# Patient Record
Sex: MALE | Race: WHITE
[De-identification: names, ages, dates, MRNs, and addresses within clinical notes are randomized per-mention and may not be internally consistent; named-entity substitution may affect disease eponyms.]

---

## 2018-08-26 ENCOUNTER — HOSPITAL ENCOUNTER (EMERGENCY)
Dept: HOSPITAL 61 - ER | Age: 58
Discharge: HOME | End: 2018-08-26
Payer: COMMERCIAL

## 2018-08-26 VITALS — BODY MASS INDEX: 35.7 KG/M2 | HEIGHT: 69 IN | WEIGHT: 241 LBS

## 2018-08-26 VITALS
DIASTOLIC BLOOD PRESSURE: 87 MMHG | SYSTOLIC BLOOD PRESSURE: 181 MMHG | SYSTOLIC BLOOD PRESSURE: 181 MMHG | DIASTOLIC BLOOD PRESSURE: 87 MMHG

## 2018-08-26 DIAGNOSIS — S89.92XA: Primary | ICD-10-CM

## 2018-08-26 DIAGNOSIS — E11.9: ICD-10-CM
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
## 2018-08-26 DIAGNOSIS — I10: ICD-10-CM

## 2018-08-26 PROCEDURE — 73562 X-RAY EXAM OF KNEE 3: CPT

## 2018-08-26 PROCEDURE — 99284 EMERGENCY DEPT VISIT MOD MDM: CPT

## 2018-08-26 NOTE — PHYS DOC
Past Medical History


Past Medical History:  Arthritis, Diabetes-Type II, Hypertension


Past Surgical History:  Other


Additional Past Surgical Histo:  MULTIPLE LUMBAR SURGERIES AT [facility]


Alcohol Use:  None


Drug Use:  None





Adult General


Chief Complaint


Chief Complaint:  KNEE INJURY





HPI


HPI





Patient is a 58  year old male who is presenting to the emergency room with 

knee pain for 2 weeks she has orthopnea of tomorrow but wanted to be checked 

out to make sure that it was not causing any more damage he twisted his knee 

about 2 weeks ago when he was running quickly across the street. he also was 

wonering if he could get an mri





Allergies


Allergies





Allergies








Coded Allergies Type Severity Reaction Last Updated Verified


 


  No Known Drug Allergies    6/16/14 No











Physical Exam


Physical Exam





Constitutional: Well developed, well nourished, no acute distress, non-toxic 

appearance. []


HENT: Normocephalic, atraumatic, bilateral external ears normal, oropharynx 

moist, no oral exudates, nose normal. []


Eyes: PERRLA, EOMI, conjunctiva normal, no discharge. [] 


Neck: Normal range of motion, no tenderness, supple, no stridor. [] 


normal resp effort no increased work of breathing.





Extremities: mild ttp noted left ant and posterior knee no erythema or 

inudration pedal pulses intact.


Neurologic: Alert and oriented X 3, normal motor function





Current Patient Data


Vital Signs





 Vital Signs








  Date Time  Temp Pulse Resp B/P (MAP) Pulse Ox O2 Delivery O2 Flow Rate FiO2


 


8/26/18 12:20 98.0 78 18 181/87 (118) 98 Room Air  





 98.0       











EKG


EKG


[]





Radiology/Procedures


Radiology/Procedures


[]


Impressions:


IMPRESSION:  


1. No acute fracture or malalignment.


2. Unchanged mild tricompartmental arthrosis. Chondrocalcinosis which can 


be seen with CPPD.


 


Electronically signed by: Sergey Guaman MD (8/26/2018 12:57 PM) 


[facility]














DICTATED and SIGNED BY:     SERGEY GUAMAN MD


DATE:     08/26/18 7027








Course & Med Decision Making


Course & Med Decision Making


Pertinent Labs and Imaging studies reviewed. (See chart for details)





58-year-old male with complaint of knee pain is been there for 2 weeks since he 

twisted he is worried about internal derangement he is also follow-up tomorrow 

I encouraged him to continue with this appointment no other acute pathology 

identified advised blood pressure follow-up in 1 month





Dragon Disclaimer


Dragon Disclaimer


This electronic medical record was generated, in whole or in part, using a 

voice recognition dictation system.





Departure


Departure


Impression:  


 Primary Impression:  


 Knee injury


Disposition:  01 HOME, SELF-CARE


Condition:  STABLE


Patient Instructions:  Knee Sprain, Easy-to-Read





Additional Instructions:  


KEEP YOUR BRACE ON, SEE DR MCNALLY AS SCHEDULED


GET YOUR BLOOD PRESSURE CHECKED IN ONE MONTH











TEODORO OROZCO MD Aug 26, 2018 14:09

## 2018-08-26 NOTE — RAD
KNEE LEFT 3V (AP, oblique, lateral)

 

INDICATION:  TWISTED KNEE A COUPLE WEEKS AGO

 

COMPARISON:  Knee radiographs dated 10/24/2016.

 

FINDINGS:  

 

No acute fracture or malalignment. Unchanged mild tricompartmental 

arthrosis. Chondrocalcinosis which can be seen with CPPD. Patellar 

enthesophyte. No suprapatellar joint effusion. Bony mineralization is 

normal for the patient's age. Vascular calcifications. No radiopaque 

foreign body.

 

IMPRESSION:  

1. No acute fracture or malalignment.

2. Unchanged mild tricompartmental arthrosis. Chondrocalcinosis which can 

be seen with CPPD.

 

Electronically signed by: Sergey Faria MD (8/26/2018 12:57 PM) 

[facility]

## 2018-08-31 ENCOUNTER — HOSPITAL ENCOUNTER (OUTPATIENT)
Dept: HOSPITAL 61 - KCIC MRI | Age: 58
Discharge: HOME | End: 2018-08-31
Attending: ORTHOPAEDIC SURGERY
Payer: COMMERCIAL

## 2018-08-31 DIAGNOSIS — E11.9: ICD-10-CM
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
## 2018-08-31 DIAGNOSIS — I10: ICD-10-CM
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
## 2018-08-31 DIAGNOSIS — S83.242A: Primary | ICD-10-CM

## 2018-08-31 DIAGNOSIS — E03.9: ICD-10-CM

## 2018-08-31 DIAGNOSIS — M71.22: ICD-10-CM
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
## 2018-08-31 PROCEDURE — 73721 MRI JNT OF LWR EXTRE W/O DYE: CPT

## 2018-08-31 NOTE — [facility]
LOWER EXT JOINT WO LT dated 8/31/2018 11:45 AM

 

Indication: Left knee pain . Injury 3 weeks ago.

 

Comparison: Plain films dated 8/27/2018.

 

Technique:

Routine multiplanar multisequence imaging performed.  . 

 

 

Findings:  

Mild tricompartmental hypertrophic change with small marginal osteophytes.

Thinning and surface irregularity of the articular cartilage throughout. 

Full-thickness cartilage loss over the weightbearing surfaces medial 

femoral condyle medial tibial plateau.

 

Small joint effusion. Large popliteal cyst measures up to 8 cm 

craniocaudal dimension. There is globular increased signal deep to the PCL

with heterogeneous signal along the posterior medial joint space.

 

Anterior cruciate and posterior cruciate ligaments are intact. Increased 

signal within the substance of the PCL with globular signal abnormality 

deep to the PCL. Medial and lateral collateral complexes are intact. Mild 

increased signal along the superficial and deep margins of the MCL complex

proximally. Iliotibial band, popliteus tendon and pes anserine complex 

within normal limits.

 

Quadriceps and patellar tendon are intact. No abnormality of the medial or

lateral retinaculum. Mild patchy superficial infrapatellar edema, 

nonspecific.

 

There is some linear signal at the medial meniscal body that does not 

definitely reach an articular surface. There is focal blunting of the free

edge of the medial meniscal root. There is also some irregular signal at 

the anterior horn of medial meniscus near the meniscal periphery with a 

globular focus of hypointense signal along the peripheral edge that 

measures up to 10 mm in size. This correlates with an area of vague 

calcification on the recent plain films. Similar findings are also noted 

at the anterior horn the lateral meniscus. Lateral meniscus is otherwise 

normal in morphology and signal.

 

 

Impression:

 

1. There are globular areas of signal abnormality within the joint space, 

along the undersurface of the PCL and along the margins of the anterior 

horn medial and lateral meniscus and posterior medial joint space, of 

uncertain etiology. Could represent nodular synovitis or pigmented nodular

synovitis or other chronic inflammatory synovial process.

2. Small joint effusion and large popliteal cyst.

3. Radial tear at the medial meniscal root, likely degenerative. There may

also be peripheral tears or degeneration of the anterior horn of medial 

and lateral menisci.

4. Increased signal of the PCL, intrasubstance degeneration versus 

partial-thickness tearing

5. Mild increased signal along the superficial and deep margins of the 

medial collateral ligament complex, likely reactive edema. Low-grade 

strain cannot be excluded.

 

Electronically signed by: Robert Bobby MD (8/31/2018 12:40 PM) 

[facility]

## 2018-09-21 VITALS — SYSTOLIC BLOOD PRESSURE: 162 MMHG | DIASTOLIC BLOOD PRESSURE: 79 MMHG

## 2019-08-08 ENCOUNTER — HOSPITAL ENCOUNTER (OUTPATIENT)
Dept: HOSPITAL 61 - KCIC DEXA | Age: 59
Discharge: HOME | End: 2019-08-08
Attending: ORTHOPAEDIC SURGERY
Payer: COMMERCIAL

## 2019-08-08 DIAGNOSIS — M71.21: ICD-10-CM

## 2019-08-08 DIAGNOSIS — M25.461: ICD-10-CM
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
## 2019-08-08 DIAGNOSIS — M94.261: ICD-10-CM

## 2019-08-08 DIAGNOSIS — E11.9: ICD-10-CM
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
## 2019-08-08 DIAGNOSIS — S83.281A: ICD-10-CM

## 2019-08-08 DIAGNOSIS — Z13.820: Primary | ICD-10-CM

## 2019-08-08 DIAGNOSIS — E07.9: ICD-10-CM
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
## 2019-08-08 PROCEDURE — 77080 DXA BONE DENSITY AXIAL: CPT

## 2019-08-08 PROCEDURE — 73721 MRI JNT OF LWR EXTRE W/O DYE: CPT

## 2019-08-08 NOTE — [facility]
EXAM: Dual energy x-ray absorptiometry (DEXA).

 

HISTORY: 59-year-old male with foot fractures and thyroid disease. 

Osteoporosis screening.

 

COMPARISON: None.

 

TECHNIQUE: Dual energy x-ray absorptiometry of the lumbar spine and left 

hip was performed.  Calculation of bone mineral density based on standard 

deviations above or below the expected young adult normal value (T-score) 

was completed. 

 

FINDINGS: The average bone mineral density in the 1st through 4th lumbar 

vertebrae is 1.591 g/cmxcm, corresponding with a T-score of 4.5.

 

The average total bone mineral density in the left hip is 1.318 g/cmxcm, 

corresponding with a T-score of 1.9.

 

IMPRESSION:   

 

Normal bone mineral density.

 

Note: Definitions established by the World Health Organization:

 

1. Normal: T-score is -1.0 or above.

2. Osteopenia: T-score is between -1.0 and -2.5 .

3. Osteoporosis: T-score is -2.5 or below. 

 

Electronically signed by: Nida Camilo MD (8/8/2019 4:42 PM) [place] [id number]

## 2019-08-16 ENCOUNTER — HOSPITAL ENCOUNTER (OUTPATIENT)
Dept: HOSPITAL 61 - SURG | Age: 59
End: 2019-08-16
Attending: ORTHOPAEDIC SURGERY
Payer: COMMERCIAL

## 2019-08-16 VITALS — WEIGHT: 250 LBS | BODY MASS INDEX: 35 KG/M2 | HEIGHT: 71 IN

## 2019-08-16 VITALS
DIASTOLIC BLOOD PRESSURE: 62 MMHG | SYSTOLIC BLOOD PRESSURE: 145 MMHG | DIASTOLIC BLOOD PRESSURE: 62 MMHG | SYSTOLIC BLOOD PRESSURE: 145 MMHG
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
## 2019-08-16 DIAGNOSIS — I10: ICD-10-CM

## 2019-08-16 DIAGNOSIS — M94.261: ICD-10-CM

## 2019-08-16 DIAGNOSIS — E11.9: ICD-10-CM
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
## 2019-08-16 DIAGNOSIS — S83.271A: ICD-10-CM

## 2019-08-16 DIAGNOSIS — S83.241A: Primary | ICD-10-CM
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
## 2019-08-16 DIAGNOSIS — Z79.84: ICD-10-CM

## 2019-08-16 PROCEDURE — C1782 MORCELLATOR: HCPCS

## 2019-08-16 PROCEDURE — 82962 GLUCOSE BLOOD TEST: CPT

## 2019-08-16 PROCEDURE — 29880 ARTHRS KNE SRG MNISECTMY M&L: CPT

## 2019-08-16 PROCEDURE — A7015 AEROSOL MASK USED W NEBULIZE: HCPCS

## 2019-08-16 RX ADMIN — FENTANYL CITRATE PRN MCG: 50 INJECTION INTRAMUSCULAR; INTRAVENOUS at 12:44

## 2019-08-16 RX ADMIN — INSULIN LISPRO PRN UNIT: 100 INJECTION, SOLUTION INTRAVENOUS; SUBCUTANEOUS at 12:52

## 2019-08-16 RX ADMIN — INSULIN LISPRO PRN UNIT: 100 INJECTION, SOLUTION INTRAVENOUS; SUBCUTANEOUS at 08:50

## 2019-08-16 RX ADMIN — FENTANYL CITRATE PRN MCG: 50 INJECTION INTRAMUSCULAR; INTRAVENOUS at 12:58

## 2019-08-16 NOTE — DISCH
DISCHARGE INSTRUCTIONS


Condition on Discharge


Condition on Discharge:  Stable





Activity After Discharge


Activity Instructions for Disc:  Other, see below (slow advance activities as 

tolerated)


Weight Bearing Status after Di:  As tolerated





Diet after Discharge


Diet after Discharge:  Regular





Wound Incision Care


Wound/Incision Care:  Ice to area for comfort, Change dressing (remove dressing 

in 2 days may then shower no soaking until sutures removed)





Contacting the  after DC


Call your doctor for:  Concerns you may have





Follow-Up


Follow up with:  Dr. Renner 10 days











DONITA RENNER MD           Aug 16, 2019 09:35

## 2019-08-16 NOTE — PDOC4
Operative Note


Operative Note


Date of surgery: 8/16/2019





Preoperative diagnosis: Right knee meniscal tear





Postoperative diagnosis: Same with medial and lateral meniscal tears and removal

greater than 2 cm loose body intercondylar notch area through an enlarged portal

and debridement of extensive apparent calcium pyrophosphate disease





Operative procedure: Right knee arthroscopy partial medial and lateral 

meniscectomies extensive debridement and removal 2 centimeter loose body





Surgeon: Jud





Anesthesia: Gen.





Estimated blood loss: 5 mL





Complications: None





Operative indications: Please see my preoperative clinic note for detailed 

operative indications and note that he was having pain and swelling particularly

with twisting pivoting activities sharp in nature and while he had decrease in 

activities due to a foot surgery with some resolution of his symptoms and he 

twisted his knee noted increased symptoms intermittently but consistently. MRI 

appeared consistent with clinical the description of the medial meniscus tear I 

had gone over with him risks benefits postoperative course of arthroscopic treat

ment nonoperative treatment alternatives the fact that I could not undo 

degenerative changes in the knee and he may have continued pain nerve or blood 

vessel damage infection medical or other anesthetic competitions among others 

all his questions were answered he agrees to proceed with surgical evaluation 

and treatment.





Operative text: Patient was identified procedure verified patient placed in the 

supine position on the operating table. After adequate amounts of general 

anesthesia were administered the right lower extremity was prepped and draped in

standard sterile fashion with a thigh tourniquet. After timeout was performed 

patient procedure identified and verified the right lower extremity was 

exsanguinated by Esmarch bandage tourniquet inflated to 350 mm mercury a lateral

portal was established a medial portal established using spinal needle 

localization and the knee joint was systematically examined. He was noted to 

have extensive grade 4 chondromalacia over the patellofemoral joint with overall

good tracking and extensive deposition consistent with CPPD throughout the knee 

joint and synovium. Although there were no large loose bodies in the gutters or 

suprapatellar pouch debridement of CPPD in the synovium was carried out with the

arthroscopic shaver he was noted to have a posterior horn medial meniscus tear 

that was displaceable and was trimmed back to stable tissue using arthroscopic 

punch and shaver. He had a large loose body anterior to the ACL in the 

intercondylar notch area that appeared to be somewhat calcified and perhaps 

related to the CPPD as well as it did impinge in full knee extension. This was 

removed through an enlarged medial portal and further debridement of the 

involved tissues with deposition was carried out with the arthroscopic shaver 

ACL was probed and found to be intact as was the PCL he had extensive 

involvement of complex tearing of the lateral meniscus which was debrided back 

to stable tissue using arthroscopic punch and shaver. Although he had 

significant chondromalacia in both the medial and lateral compartment no further

debridement or chondroplasty was necessary. No further loose bodies were noted 

joint was drained of arthroscopic fluid enlarged portal was closed with buried 

Vicryl suture skin closure with nylon suture injection of half percent plain 

Marcaine was injected throughout the portal areas incision and fat pad sterile 

dressings were applied toes were noted be warm pink find deflation of tourniquet

patient was returned recovery room in stable condition having tolerated 

procedure well











DONITA MCNALLY MD           Aug 16, 2019 23:29

## 2022-01-17 ENCOUNTER — HOSPITAL ENCOUNTER (OUTPATIENT)
Dept: HOSPITAL 61 - SURGPAT | Age: 62
End: 2022-01-17
Attending: PLASTIC SURGERY
Payer: COMMERCIAL

## 2022-01-17 VITALS — DIASTOLIC BLOOD PRESSURE: 76 MMHG | SYSTOLIC BLOOD PRESSURE: 141 MMHG

## 2022-01-17 DIAGNOSIS — I10: ICD-10-CM

## 2022-01-17 DIAGNOSIS — Z01.812: Primary | ICD-10-CM

## 2022-01-17 LAB
ANION GAP SERPL CALC-SCNC: 11 MMOL/L (ref 6–14)
APTT BLD: 26 SEC (ref 24–38)
BASOPHILS # BLD AUTO: 0.1 X10^3/UL (ref 0–0.2)
BASOPHILS NFR BLD: 1 % (ref 0–3)
BUN SERPL-MCNC: 37 MG/DL (ref 8–26)
CALCIUM SERPL-MCNC: 8.5 MG/DL (ref 8.5–10.1)
CHLORIDE SERPL-SCNC: 97 MMOL/L (ref 98–107)
CO2 SERPL-SCNC: 25 MMOL/L (ref 21–32)
CREAT SERPL-MCNC: 1.5 MG/DL (ref 0.7–1.3)
EOSINOPHIL NFR BLD: 0.3 X10^3/UL (ref 0–0.7)
EOSINOPHIL NFR BLD: 2 % (ref 0–3)
ERYTHROCYTE [DISTWIDTH] IN BLOOD BY AUTOMATED COUNT: 13.5 % (ref 11.5–14.5)
ERYTHROCYTE [DISTWIDTH] IN BLOOD BY AUTOMATED COUNT: 13.7 % (ref 11.5–14.5)
GFR SERPLBLD BASED ON 1.73 SQ M-ARVRAT: 47.7 ML/MIN
GLUCOSE SERPL-MCNC: 241 MG/DL (ref 70–99)
HCT VFR BLD CALC: 38.5 % (ref 39–53)
HCT VFR BLD CALC: 39.5 % (ref 39–53)
HGB BLD-MCNC: 12.8 G/DL (ref 13–17.5)
HGB BLD-MCNC: 13 G/DL (ref 13–17.5)
LYMPHOCYTES # BLD: 3.8 X10^3/UL (ref 1–4.8)
LYMPHOCYTES NFR BLD AUTO: 35 % (ref 24–48)
MCH RBC QN AUTO: 29 PG (ref 25–35)
MCH RBC QN AUTO: 29 PG (ref 25–35)
MCHC RBC AUTO-ENTMCNC: 33 G/DL (ref 31–37)
MCHC RBC AUTO-ENTMCNC: 33 G/DL (ref 31–37)
MCV RBC AUTO: 89 FL (ref 79–100)
MCV RBC AUTO: 89 FL (ref 79–100)
MONO #: 0.7 X10^3/UL (ref 0–1.1)
MONOCYTES NFR BLD: 7 % (ref 0–9)
NEUT #: 6 X10^3/UL (ref 1.8–7.7)
NEUTROPHILS NFR BLD AUTO: 55 % (ref 31–73)
PLATELET # BLD AUTO: 242 X10^3/UL (ref 140–400)
PLATELET # BLD AUTO: 253 X10^3/UL (ref 140–400)
POTASSIUM SERPL-SCNC: 4.2 MMOL/L (ref 3.5–5.1)
PROTHROMBIN TIME: 14 SEC (ref 11.7–14)
RBC # BLD AUTO: 4.35 X10^6/UL (ref 4.3–5.7)
RBC # BLD AUTO: 4.41 X10^6/UL (ref 4.3–5.7)
SODIUM SERPL-SCNC: 133 MMOL/L (ref 136–145)
WBC # BLD AUTO: 10.5 X10^3/UL (ref 4–11)
WBC # BLD AUTO: 10.9 X10^3/UL (ref 4–11)

## 2022-01-17 PROCEDURE — 83036 HEMOGLOBIN GLYCOSYLATED A1C: CPT

## 2022-01-17 PROCEDURE — 36415 COLL VENOUS BLD VENIPUNCTURE: CPT

## 2022-01-17 PROCEDURE — 85025 COMPLETE CBC W/AUTO DIFF WBC: CPT

## 2022-01-17 NOTE — EKG
[facility]

              [address] [place] [address]

Test Date:    2022               Test Time:    11:37:34

Pat Name:     MINOR DICKSON           Department:   

Patient ID:   [id number]           Room:          

Gender:       M                        Technician:   

:          [id number]               Requested By: BENJAMIN JAIME

Order Number: [id number]           Reading MD:   Shahbaz Biggs MD

                                 Measurements

Intervals                              Axis          

Rate:                                  P:            

TX:                                    QRS:          

QRSD:                                  T:            

QT:                                                  

QTc:                                                 

                           Interpretive Statements

SR

NO ACUTE FINDINGS



Electronically Signed On 2022 16:13:46 CST by Shahbaz Biggs MD

## 2022-01-18 ENCOUNTER — HOSPITAL ENCOUNTER (OUTPATIENT)
Dept: HOSPITAL 61 - SURG | Age: 62
Discharge: HOME | End: 2022-01-18
Attending: PLASTIC SURGERY
Payer: COMMERCIAL

## 2022-01-18 VITALS — SYSTOLIC BLOOD PRESSURE: 166 MMHG | DIASTOLIC BLOOD PRESSURE: 79 MMHG

## 2022-01-18 VITALS — BODY MASS INDEX: 37.16 KG/M2 | HEIGHT: 71 IN | WEIGHT: 265.44 LBS

## 2022-01-18 VITALS — SYSTOLIC BLOOD PRESSURE: 138 MMHG | DIASTOLIC BLOOD PRESSURE: 60 MMHG

## 2022-01-18 DIAGNOSIS — Z87.891: ICD-10-CM

## 2022-01-18 DIAGNOSIS — E11.9: ICD-10-CM

## 2022-01-18 DIAGNOSIS — I10: ICD-10-CM

## 2022-01-18 DIAGNOSIS — E03.9: ICD-10-CM

## 2022-01-18 DIAGNOSIS — M1A.9XX1: Primary | ICD-10-CM

## 2022-01-18 DIAGNOSIS — Z79.899: ICD-10-CM

## 2022-01-18 DIAGNOSIS — E78.00: ICD-10-CM

## 2022-01-18 DIAGNOSIS — K21.9: ICD-10-CM

## 2022-01-18 DIAGNOSIS — Z98.890: ICD-10-CM

## 2022-01-18 DIAGNOSIS — M19.90: ICD-10-CM

## 2022-01-18 DIAGNOSIS — F32.9: ICD-10-CM

## 2022-01-18 LAB — HBA1C MFR BLD: 9.8 % (ref 4.8–5.6)

## 2022-01-18 PROCEDURE — 88304 TISSUE EXAM BY PATHOLOGIST: CPT

## 2022-01-18 PROCEDURE — A6258 TRANSPARENT FILM >16<=48 IN: HCPCS

## 2022-01-18 PROCEDURE — 85610 PROTHROMBIN TIME: CPT

## 2022-01-18 PROCEDURE — A6223 GAUZE >16<=48 NO W/SAL W/O B: HCPCS

## 2022-01-18 PROCEDURE — 82962 GLUCOSE BLOOD TEST: CPT

## 2022-01-18 PROCEDURE — 93005 ELECTROCARDIOGRAM TRACING: CPT

## 2022-01-18 PROCEDURE — 26113 EXC HAND TUM DEEP 1.5 CM/>: CPT

## 2022-01-18 PROCEDURE — A4930 STERILE, GLOVES PER PAIR: HCPCS

## 2022-01-18 PROCEDURE — A6402 STERILE GAUZE <= 16 SQ IN: HCPCS

## 2022-01-18 PROCEDURE — A6443 CONFORM BAND N/S W>=3"<5"/YD: HCPCS

## 2022-01-18 PROCEDURE — 80048 BASIC METABOLIC PNL TOTAL CA: CPT

## 2022-01-18 PROCEDURE — 36415 COLL VENOUS BLD VENIPUNCTURE: CPT

## 2022-01-18 PROCEDURE — 85027 COMPLETE CBC AUTOMATED: CPT

## 2022-01-18 PROCEDURE — 85730 THROMBOPLASTIN TIME PARTIAL: CPT

## 2022-01-18 RX ADMIN — FENTANYL CITRATE PRN MCG: 50 INJECTION INTRAMUSCULAR; INTRAVENOUS at 11:38

## 2022-01-18 RX ADMIN — INSULIN LISPRO PRN UNIT: 100 INJECTION, SOLUTION INTRAVENOUS; SUBCUTANEOUS at 11:39

## 2022-01-18 RX ADMIN — INSULIN LISPRO PRN UNIT: 100 INJECTION, SOLUTION INTRAVENOUS; SUBCUTANEOUS at 09:33

## 2022-01-18 RX ADMIN — FENTANYL CITRATE PRN MCG: 50 INJECTION INTRAMUSCULAR; INTRAVENOUS at 11:16

## 2022-01-18 NOTE — PDOC4
OPERATIVE NOTE


Pre-Op Diagnosis:


Mass of dorsum of left hand





Post-Op Diagnosis:


Same





Procedure Performed:


Excision of mass of dorsum of left hand, CPT 78672





Surgeon:


Nubia Jaime MD





Anesthesia Type:


General





Blood Loss:


10mL





Specimans Obtained:


Mass of left hand





Findings:


See op note





Complications:


None





Operative Note:


Informed consent was obtained in the perioperative holding area. The risks of 

bleeding, infection, hematoma, wound dehiscence, wound healing issues, mass 

recurrence, need to additional excision procedures, need for revision 

procedures, damage to surrounding nerves/arteries/veins/tendons, loss of 

function, stiffness, ongoing pain was discussed with the patient. He understood 

and desired to proceed.





The patient was brought to the OR and placed on the OR table in the supine 

position. A timeout was completed verifying the correct patient and procedure. 

SCDs were placed on both lower extremities. General anesthesia was induced. IV 

Antibiotics were given. The left hand was outstretched on a hand table then 

prepped and draped in the usual sterile fashion. 


A longitudinal incision through the dermis was made extending just beyond the 

boundaries of the mass.  Tenotomy scissors were used to spread the subcutaneous 

tissues until the mass was reached.  Care was taken to preserve the overlying 

radial sensory nerves.  The mass was noted to be friable and dissection with the

tenotomy scissors proceeded around the mass to free it from the surrounding 

subcutaneous tissues.  The mass was noted to be adherent to the underlying EDC 

tendons of the middle finger.  A #15 scalpel was used to dissect the mass from 

the underlying tendon.  More than 75% of the underlying tendon was remaining at 

the end of the dissection.  The wound was irrigated with saline.  The deep 

subcutaneous tissue was approximated with 3-0 PDS suture.  The skin was closed 

with 4-0 nylon suture in an interrupted horizontal mattress fashion.  Bacit

racin, Xeroform, and a soft dressing were placed.  The patient tolerated 

procedure well and was taken to the PACU in stable condition.











NUBIA JIAME MD           Jan 18, 2022 09:43

## 2022-01-20 NOTE — PATHOLOGY
[facility] Accession Number: [id number]

.                                                                01

Material submitted:                                        .

hand - LEFT HAND MASS. Modifiers: left

.                                                                01

Clinical history:                                          .

LT WRIST SOFT TISSUE MASS

EXCISION OF SOFT TISSUE MASS TO LEFT WRIST

.                                                                02

**********************************************************************

Diagnosis:

Fibroadipose tissue, left hand mass, left wrist soft tissue mass excision:

- Nodular crystalline deposits with surrounding granulomatous inflammatory

reaction, consistent with gouty tophus.

.

(JPM:mml; 01/20/2022)

[facility]  01/20/2022  1306 Local

**********************************************************************

.                                                                02

Comment:

Sections of the left wrist mass reveal nodular crystalline deposits which

are surrounded by a characteristic granulomatous inflammatory response.

Although the crystalline deposits have largely been lost with formalin

fixation and tissue processing, the histologic features are consistent

with gouty tophus.

.

(JPM:mml; 01/20/2022)

.                                                                02

Electronically signed:                                     .

Delio Lovett MD, Pathologist

NPI- [id number]

.                                                                01

Gross description:                                         .

The specimen is received in formalin, labeled "Bertrand Lakhani, left hand

mass".  Received is a single, 3.1 x 2.3 x 1.0 cm, irregularly shaped

fragment of tan-white, shaggy and fibrous tissue with multiple metallic

clips.  The exterior surface is inked black, and the specimen is serially

sectioned to reveal a solid, tan-white cut surface.  Representative

sections are submitted in cassette A1.

(JGG; 1/19/2022)

[facility]  01/20/2022  1302 Local

.                                                                02

Pathologist provided ICD-10:

R22.32

.                                                                02

CPT                                                        .

333123

Specimen Comment: A courtesy copy of this report has been sent to [phone], [phone]

Specimen Comment: [id number]

Specimen Comment: Report sent to  / DR LILLIG

Specimen Comment: A duplicate report has been generated due to demographic updates.

***Performed at:  01

   [facility] [id number] [address]  [id number]

   MD Philipp Frye MD Phone:  [phone]

***Performed at:  02

   [facility]

   [address] [place], [place]  [id number]

   MD Delio Lovett MD Phone:  [phone]